# Patient Record
Sex: FEMALE | Race: WHITE
[De-identification: names, ages, dates, MRNs, and addresses within clinical notes are randomized per-mention and may not be internally consistent; named-entity substitution may affect disease eponyms.]

---

## 2023-05-22 ENCOUNTER — HOSPITAL ENCOUNTER (OUTPATIENT)
Dept: HOSPITAL 47 - FBPOP | Age: 33
Discharge: HOME | End: 2023-05-22
Attending: OBSTETRICS & GYNECOLOGY
Payer: COMMERCIAL

## 2023-05-22 VITALS
HEART RATE: 82 BPM | DIASTOLIC BLOOD PRESSURE: 57 MMHG | TEMPERATURE: 98 F | RESPIRATION RATE: 16 BRPM | SYSTOLIC BLOOD PRESSURE: 111 MMHG

## 2023-05-22 DIAGNOSIS — Z88.2: ICD-10-CM

## 2023-05-22 DIAGNOSIS — O26.892: Primary | ICD-10-CM

## 2023-05-22 DIAGNOSIS — Z3A.24: ICD-10-CM
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
## 2023-05-22 PROCEDURE — 99213 OFFICE O/P EST LOW 20 MIN: CPT

## 2023-05-22 RX ADMIN — HUMAN RHO(D) IMMUNE GLOBULIN ONE UNIT: 300 INJECTION, SOLUTION INTRAMUSCULAR at 22:48

## 2023-05-22 RX ADMIN — HUMAN RHO(D) IMMUNE GLOBULIN ONE UNIT: 300 INJECTION, SOLUTION INTRAMUSCULAR at 22:46
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
## 2023-08-16 ENCOUNTER — HOSPITAL ENCOUNTER (OUTPATIENT)
Dept: HOSPITAL 47 - FBPOP | Age: 33
Discharge: HOME | End: 2023-08-16
Attending: OBSTETRICS & GYNECOLOGY
Payer: COMMERCIAL

## 2023-08-16 VITALS
DIASTOLIC BLOOD PRESSURE: 76 MMHG | RESPIRATION RATE: 16 BRPM | TEMPERATURE: 97 F | HEART RATE: 90 BPM | SYSTOLIC BLOOD PRESSURE: 121 MMHG

## 2023-08-16 DIAGNOSIS — Z88.2: ICD-10-CM

## 2023-08-16 DIAGNOSIS — Z3A.36: ICD-10-CM

## 2023-08-16 DIAGNOSIS — O47.03: Primary | ICD-10-CM

## 2023-08-16 PROCEDURE — 99213 OFFICE O/P EST LOW 20 MIN: CPT

## 2023-08-16 PROCEDURE — 59025 FETAL NON-STRESS TEST: CPT

## 2023-08-16 NOTE — P.MSEPDOC
Presenting Problems





- Arrival Data


Date of Arrival on Unit: 23


Time of Arrival on Unit: 04:47


Mode of Transport: Ambulatory





- Complaint


OB-Reason for Admission/Chief Complaint: Possible Onset of Labor





Prenatal Medical History





- Pregnancy Information


: 3


Para: 2


Term: 1


: 1


Abortions: Spontaneous or Elective: 0


Number of Living Children: 2





- Gestational Age


Gestational Age by FOREST (wks/days): 36 Weeks and 6 Days





Review of Systems





- Review of Systems


Constitutional: No problems


Breast: No problems


ENT: No problems


Cardiovascular: No problems


Respiratory: No problems


Gastrointestinal: No problems


Genitourinary: No problems


Musculoskeletal: No problems


Neurological: No problems


Skin: No problems





Vital Signs





- Temperature


Temperature: 97.0 F


Temperature Source: Temporal Artery Scan





- Pulse


  ** Right


Pulse Rate: 90


Pulse Assessment Method: Automatic Cuff





- Respirations


Respiratory Rate: 16


Oxygen Delivery Method: Room Air





- Blood Pressure


  ** Right Arm


Blood Pressure: 121/76


Blood Pressure Mean: 91


Blood Pressure Source: Automatic Cuff





Medical Screen Scoring





- Cervical Exam


Dilation (cm): 3


Effacement (%): 70


Station: -2


Membranes: Intact





- Uterine Contractions


Frequency From (mins): 3


Frequency To (mins): 5


Duration From (seconds): 50


Duration To (seconds): 80


Intensity: Mild


Resting: Soft to palpation





- Fetal Assessment - Baby A


Baseline FHR: 145


Fetal Heart Rate - NICHD Category: Category I (Normal)


NST: Reactive





Physician Notification





- Physician Notified


Physician Notified Date: 23





- Notification Comment


Comment: Dr. Maria assed pt, cervical exam exam performed, no cervical change 

made, pt to be discharged home and keep appointment in office tomorrow





Maternal Fetal Triage Index





- Maternal Fetal Triage Index


Presenting for scheduled procedure w/no complaint: No





- Stat/Priority 1


Stat Priority 1: No





- Urgent/Priority 2


Urgent Priority 2: No





- Prompt/Priority 3


Prompt Priority 3: Yes


Criteria Met for Priority 3: Pt presents to triage with complaints of 

contractions starting at midnight and worsening





Disposition





- Disposition


OB Disposition: Triage, Discharge to home, Written follow up instructions 

reviewed


Discharge Date: 23


Discharge Time: 08:47


I agree with the RN Medical Screening Exam: Yes


Case reviewed; plan agreed upon as documented in EMR&OBIX.: Yes


Diagnosis: FALSE LABOR BEFORE 37 COMPLETED WEEKS OF GEST, THIRD TRI

## 2023-08-24 ENCOUNTER — HOSPITAL ENCOUNTER (INPATIENT)
Dept: HOSPITAL 47 - FBPOP | Age: 33
LOS: 1 days | Discharge: HOME | DRG: 560 | End: 2023-08-25
Attending: OBSTETRICS & GYNECOLOGY | Admitting: OBSTETRICS & GYNECOLOGY
Payer: COMMERCIAL

## 2023-08-24 DIAGNOSIS — Z3A.38: ICD-10-CM

## 2023-08-24 LAB
BASOPHILS # BLD AUTO: 0 K/UL (ref 0–0.2)
BASOPHILS NFR BLD AUTO: 0 %
EOSINOPHIL # BLD AUTO: 0.1 K/UL (ref 0–0.7)
EOSINOPHIL NFR BLD AUTO: 1 %
ERYTHROCYTE [DISTWIDTH] IN BLOOD BY AUTOMATED COUNT: 4.05 M/UL (ref 3.8–5.4)
ERYTHROCYTE [DISTWIDTH] IN BLOOD: 15 % (ref 11.5–15.5)
HCT VFR BLD AUTO: 33 % (ref 34–46)
HGB BLD-MCNC: 11 GM/DL (ref 11.4–16)
LYMPHOCYTES # SPEC AUTO: 1.4 K/UL (ref 1–4.8)
LYMPHOCYTES NFR SPEC AUTO: 12 %
MCH RBC QN AUTO: 27.2 PG (ref 25–35)
MCHC RBC AUTO-ENTMCNC: 33.4 G/DL (ref 31–37)
MCV RBC AUTO: 81.6 FL (ref 80–100)
MONOCYTES # BLD AUTO: 0.5 K/UL (ref 0–1)
MONOCYTES NFR BLD AUTO: 4 %
NEUTROPHILS # BLD AUTO: 8.9 K/UL (ref 1.3–7.7)
NEUTROPHILS NFR BLD AUTO: 81 %
PLATELET # BLD AUTO: 293 K/UL (ref 150–450)
WBC # BLD AUTO: 11 K/UL (ref 3.8–10.6)

## 2023-08-24 PROCEDURE — 85025 COMPLETE CBC W/AUTO DIFF WBC: CPT

## 2023-08-24 PROCEDURE — 86850 RBC ANTIBODY SCREEN: CPT

## 2023-08-24 PROCEDURE — 86880 COOMBS TEST DIRECT: CPT

## 2023-08-24 PROCEDURE — 86901 BLOOD TYPING SEROLOGIC RH(D): CPT

## 2023-08-24 PROCEDURE — 86900 BLOOD TYPING SEROLOGIC ABO: CPT

## 2023-08-24 PROCEDURE — 85461 HEMOGLOBIN FETAL: CPT

## 2023-08-24 PROCEDURE — 10907ZC DRAINAGE OF AMNIOTIC FLUID, THERAPEUTIC FROM PRODUCTS OF CONCEPTION, VIA NATURAL OR ARTIFICIAL OPENING: ICD-10-PCS

## 2023-08-24 PROCEDURE — 88307 TISSUE EXAM BY PATHOLOGIST: CPT

## 2023-08-24 PROCEDURE — 99213 OFFICE O/P EST LOW 20 MIN: CPT

## 2023-08-24 PROCEDURE — 86870 RBC ANTIBODY IDENTIFICATION: CPT

## 2023-08-24 PROCEDURE — 59025 FETAL NON-STRESS TEST: CPT

## 2023-08-24 RX ADMIN — IBUPROFEN SCH MG: 600 TABLET ORAL at 19:47

## 2023-08-24 RX ADMIN — DOCUSATE SODIUM AND SENNOSIDES SCH: 50; 8.6 TABLET ORAL at 20:18

## 2023-08-24 NOTE — P.HPOB
History of Present Illness


H&P Date: 23


Chief Complaint: IUP at 38-0/7 weeks, active labor





This is a 33 yo  at 38 weeks that presents with complaints of regular 

painful contractions.  EDC 2023 She has been receiving routine prenatal 

care with myself.   She has been noting irregular contractions for the last few 

weeks, denies LOF, VB, good fetal movement is noted.  


On prenatal bloodwork patient a blood type of A-, rubella status nonimmune, hepB

surface antigen negative, HIV negative, RPR is nonreactive, group beta strep 

culture negative.








OB/GYN history


#1 2013 41 weeks 7-7 VAVD


#2 10/25/2020  37 weeks  7-0 


#3 current








Review of Systems


Constitutional: Denies chills, Denies fatigue, Denies fever


Ears, nose, mouth and throat: Denies headache


Cardiovascular: Reports leg edema


Respiratory: Denies dyspnea


Gastrointestinal: Denies constipation, Denies diarrhea, Denies nausea, Denies 

vomiting


Genitourinary: Reports pregnant





Past Medical History


Past Medical History: Asthma


Additional Past Medical History / Comment(s): excercise induced asthma


History of Any Multi-Drug Resistant Organisms: None Reported


Past Surgical History: No Surgical Hx Reported


Past Anesthesia/Blood Transfusion Reactions: No Reported Reaction


Smoking Status: Never smoker





- Past Family History


  ** Mother


Family Medical History: No Reported History





Medications and Allergies


                                Home Medications











 Medication  Instructions  Recorded  Confirmed  Type


 


Prenatal Vit No.180/Iron/Folic 1 each PO DAILY MDD 1 tab 20 

History





[Prenatal Plus Tablet]    








                                    Allergies











Allergy/AdvReac Type Severity Reaction Status Date / Time


 


sulfamethoxazole Allergy  Anaphylaxis Verified 23 04:53





[From Bactrim]     


 


trimethoprim [From Bactrim] Allergy  Anaphylaxis Verified 23 04:53














Exam


Osteopathic Statement: *.  No significant issues noted on an osteopathic 

structural exam other than those noted in the History and Physical/Consult.


                                Intake and Output











 23





 06:59 14:59 22:59


 


Other:   


 


  Weight   74.843 kg














Targeted physical exam is performed in this date and generalist a well-nourished

well-developed pregnant female in no acute distress, breathing is noted be 

nonlabored, heart has a regular rate and rhythm, abdomen is gravid, on cervical 

exam she is 6/80/-2 with a bulging bag of water.





Results


Result Diagrams: 


                                 23 17:00





                  Abnormal Lab Results - Last 24 Hours (Table)











  23 Range/Units





  17:00 


 


WBC  11.0 H  (3.8-10.6)  k/uL


 


Hgb  11.0 L  (11.4-16.0)  gm/dL


 


Hct  33.0 L  (34.0-46.0)  %


 


Neutrophils #  8.9 H  (1.3-7.7)  k/uL














Assessment and Plan


(1) Term pregnancy


Current Visit: Yes   Status: Acute   Code(s): Z34.90 - ENCNTR FOR SUPRVSN OF 

NORMAL PREGNANCY, UNSP, UNSP TRIMESTER   SNOMED Code(s): 13058754


   





(2) Active labor


Current Visit: Yes   Status: Acute   Code(s): ZIM1246 -    SNOMED Code(s): 118

[id number]


   


Plan: 





32-year-old  at 38 0/7 weeks of gestation presents with complaints of 

regular painful contractions.  Patient states contractions began this afternoon 

and became regular.  Patient consented to labor and delivery.  Patient is 

declining all interventions but is okay with IV hep-locked.  Anticipate 

spontaneous vaginal delivery.

## 2023-08-24 NOTE — P.PROBDLV
Vaginal Delivery Note





- .


Vaginal Delivery Note: 





32-year-old  at 38-0/7 weeks that presents to labor and delivery with 

complaints of regular painful contractions.  Patient states the contractions 

started this afternoon and became more regular.  Patient presented to labor and 

delivery.  Patient was noted to be 4+ centimeters dilated.  Patient was admitted

to labor and delivery.  Contractions are noted every 2-3 minutes.  Fetal heart 

tones noted to be category 1.  Patient presents to labor eventually noted to be 

9 cm, amniotomy is performed and clear fluid was obtained.  Significant bloody 

show was appreciated at the time.  Patient progressed to anterior lip.  2 large 

clots were passed, anterior lip was reduced with good maternal effort patient 

had a normal spontaneous vaginal delivery of a viable female infant in occiput 

anterior presentation at 1905.  Spontaneous cry was noted at birth.  Fetal 

weight is pending as infant is unable maternal chest.  After two-minute delay 

the umbilical cord was doubly clamped and cut.  Cord blood was then taken.  The 

placenta was delivered spontaneously intact with a three-vessel cord being noted

large clots were noted around the placenta.


Upon inspection of the patient's vaginal vault no lacerations were appreciated. 

The anterior lip of the cervix was visualized and no lacerations were 

appreciated.


Uterus is noted be firm and below the umbilicus.  Estimated blood loss 200 mL's.


All counts are correct 2, patient and infant tolerated delivery well and are r

esting comfortably.

## 2023-08-25 VITALS — HEART RATE: 83 BPM | DIASTOLIC BLOOD PRESSURE: 63 MMHG | SYSTOLIC BLOOD PRESSURE: 100 MMHG

## 2023-08-25 VITALS — RESPIRATION RATE: 20 BRPM

## 2023-08-25 VITALS — TEMPERATURE: 98 F

## 2023-08-25 RX ADMIN — IBUPROFEN SCH MG: 600 TABLET ORAL at 17:28

## 2023-08-25 RX ADMIN — ACETAMINOPHEN PRN MG: 325 TABLET, FILM COATED ORAL at 19:40

## 2023-08-25 RX ADMIN — IBUPROFEN SCH MG: 600 TABLET ORAL at 11:47

## 2023-08-25 RX ADMIN — ACETAMINOPHEN PRN MG: 325 TABLET, FILM COATED ORAL at 09:52

## 2023-08-25 RX ADMIN — DOCUSATE SODIUM AND SENNOSIDES SCH: 50; 8.6 TABLET ORAL at 11:02

## 2023-08-25 RX ADMIN — ACETAMINOPHEN PRN MG: 325 TABLET, FILM COATED ORAL at 15:12

## 2023-08-25 RX ADMIN — IBUPROFEN SCH: 600 TABLET ORAL at 08:00

## 2023-08-25 RX ADMIN — IBUPROFEN SCH MG: 600 TABLET ORAL at 01:04

## 2023-08-25 RX ADMIN — DOCUSATE SODIUM AND SENNOSIDES SCH EACH: 50; 8.6 TABLET ORAL at 15:13

## 2023-08-25 NOTE — P.DS
Providers


Date of admission: 


23 16:38





Expected date of discharge: 23


Attending physician: 


Marisol Maria





Primary care physician: 


Stated None








- Discharge Diagnosis(es)


(1) Term pregnancy


Current Visit: Yes   Status: Acute   





(2) Active labor


Current Visit: Yes   Status: Acute   





(3) Status post normal vaginal delivery


Current Visit: Yes   Status: Acute   


Hospital Course: 





This is a 32-year-old  3 para 2 at 38-0/7 weeks that presented to labor 

and delivery with complaints of regular painful contractions.  Patient received 

routine prenatal care which has been essentially uncomplicated.  Patient is 

known Rh- and did receive program early in the pregnancy and it was repeated 12 

later.  Patient is up-to-date on Christine.  Patient was admitted to labor and 

delivery and noted to be in active labor.  Patient progressed through labor 

eventually becoming 97 m.  Patient underwent amniotomy after consent at 9 cm 

patient progressed quickly to complete began pushing and had a normal 

spontaneous vaginal delivery of a viable female infant at 1905, weight of 6 lbs.

15 oz., Apgars of 9 and 9 at one and 5 minutes respectively. 


No vaginal lacerations were appreciated on exam post delivery.  Patient's post 

partum course has been uneventful.  On this postpartum day #1 she is ambulating 

and voiding without difficulty.  She is tolerating a regular diet without nausea

or vomiting.  Breast-feeding is going well.  She denies concerns and would like 

discharge home at 24 hours.


Patient Condition at Discharge: Good





Plan - Discharge Summary


New Discharge Prescriptions: 


No Action


   Prenatal Vit No.180/Iron/Folic [Prenatal Plus Tablet] 1 each PO DAILY MDD 1 

tab


Discharge Medication List





Prenatal Vit No.180/Iron/Folic [Prenatal Plus Tablet] 1 each PO DAILY MDD 1 tab 

20 [History]








Follow up Appointment(s)/Referral(s): 


Marisol Maria DO [Doctor of Osteopathic Medicine] - 4 Weeks


Patient Instructions/Handouts:  Vaginal Delivery (DC), Vaginal Delivery (GEN)


Activity/Diet/Wound Care/Special Instructions: 


No tub baths or intercourse until 6 weeks postpartum.  Over-the-counter 

ibuprofen as needed for pain.  Patient is to call the office to make a routine 

postpartum visit in 4 weeks.  Should she have any concerns prior to this visit 

she is urged to call the office.  Postpartum bleeding is reviewed and all 

questions were answered.


Discharge Disposition: HOME SELF-CARE